# Patient Record
(demographics unavailable — no encounter records)

---

## 2025-05-02 NOTE — PHYSICAL EXAM
[de-identified] : Large right inguinal hernia - not tender [de-identified] : Some pain with lifting of right leg and movement in right hip

## 2025-05-02 NOTE — HISTORY OF PRESENT ILLNESS
[FreeTextEntry1] : FU after hospital and rehab - fell walking up her two steps - went to  letter on ground - mat curled and fell Fractured pelvis -  Was in rehab x 7 days Bothered now by hernia Fracture area feels worse now

## 2025-05-02 NOTE — ASSESSMENT
[FreeTextEntry1] : Pt with pelvic fracture - to fu with orthopedics and to start PT Will need hernia repair once better - has been delaying this for years. To do bone density -and will likely need treatment od osteoporosis. FU 6-8 weeks.
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
## 2025-07-08 NOTE — PHYSICAL EXAM
[Normal Sclera/Conjunctiva] : normal sclera/conjunctiva [Soft] : abdomen soft [No Masses] : no abdominal mass palpated [No HSM] : no HSM [Normal] : no CVA or spinal tenderness [de-identified] : Large ventral hernias

## 2025-07-08 NOTE — ASSESSMENT
[FreeTextEntry1] : Pt with above medical issues. Pt to continue PT - no contraindication Should see surgeon for hernia repairs To do bone density FU 8/25 - [facility]